# Patient Record
Sex: FEMALE | Race: WHITE | Employment: FULL TIME | URBAN - METROPOLITAN AREA
[De-identification: names, ages, dates, MRNs, and addresses within clinical notes are randomized per-mention and may not be internally consistent; named-entity substitution may affect disease eponyms.]

---

## 2023-01-04 ENCOUNTER — APPOINTMENT (OUTPATIENT)
Dept: GENERAL RADIOLOGY | Age: 23
End: 2023-01-04

## 2023-01-04 ENCOUNTER — HOSPITAL ENCOUNTER (EMERGENCY)
Age: 23
Discharge: HOME | End: 2023-01-04

## 2023-01-04 VITALS
SYSTOLIC BLOOD PRESSURE: 141 MMHG | WEIGHT: 293 LBS | TEMPERATURE: 98.8 F | HEART RATE: 64 BPM | OXYGEN SATURATION: 100 % | RESPIRATION RATE: 16 BRPM | DIASTOLIC BLOOD PRESSURE: 80 MMHG

## 2023-01-04 DIAGNOSIS — F17.200 SMOKER: ICD-10-CM

## 2023-01-04 DIAGNOSIS — M25.561 ACUTE PAIN OF RIGHT KNEE: Primary | ICD-10-CM

## 2023-01-04 PROCEDURE — 73590 X-RAY EXAM OF LOWER LEG: CPT

## 2023-01-04 PROCEDURE — 73560 X-RAY EXAM OF KNEE 1 OR 2: CPT

## 2023-01-04 PROCEDURE — 99283 EMERGENCY DEPT VISIT LOW MDM: CPT

## 2023-01-04 RX ORDER — LIDOCAINE 50 MG/G
1 PATCH TOPICAL DAILY
Qty: 10 PATCH | Refills: 0 | Status: SHIPPED | OUTPATIENT
Start: 2023-01-04 | End: 2023-01-14

## 2023-01-04 ASSESSMENT — ENCOUNTER SYMPTOMS
ABDOMINAL PAIN: 0
SHORTNESS OF BREATH: 0
VOMITING: 0
BACK PAIN: 0
DIARRHEA: 0
NAUSEA: 0
CONSTIPATION: 0
EYE PAIN: 0
SORE THROAT: 0
COUGH: 0
RHINORRHEA: 0

## 2023-01-04 NOTE — DISCHARGE INSTRUCTIONS
Please take Tylenol/Motrin as you were taking before coming to ED. Please take lidocaine patches as prescribed. Please follow-up with the orthopedic doctor as we discussed.

## 2023-01-04 NOTE — Clinical Note
Patty Stinson was seen and treated in our emergency department on 1/4/2023. She may return to work on 01/06/2023. Please allow Jason Thomas to sit at work until she is cleared by either her family doctor or an orthopedic doctor     If you have any questions or concerns, please don't hesitate to call.       Juan Chau, JEFFREY - CNP

## 2023-01-05 NOTE — ED PROVIDER NOTES
[address] ENCOUNTER        Pt Name: Clay Alfred  MRN: [id number]  [place] 2000  Date of evaluation: 1/4/2023  Provider: JEFFREY Mills CNP  PCP: None None  Note Started: 8:13 PM EST 1/4/23      SAMEERA. I have evaluated this patient. My supervising physician was available for consultation. CHIEF COMPLAINT       Chief Complaint   Patient presents with    Knee Pain     R, since last thursday         HISTORY OF PRESENT ILLNESS: 1 or more Elements     History From: Patient  Limitations to history : None    Clay Alfred is a 23 y.o. female who presents to the ED with concern for right knee pain. This been ongoing since the snow and ice last week. Unsure if she twisted at that time. Has chronic MSK problems. Today she has pain with ambulation with a popping sound that she hears when she straightens her leg completely. She is tender to the medial and lateral aspects of the knee. She is also requesting a work note so she can sit at work. Nursing Notes were all reviewed and agreed with or any disagreements were addressed in the HPI. REVIEW OF SYSTEMS :      Review of Systems   Constitutional:  Negative for chills, diaphoresis and fever. HENT:  Negative for congestion, rhinorrhea and sore throat. Eyes:  Negative for pain and visual disturbance. Respiratory:  Negative for cough and shortness of breath. Cardiovascular:  Negative for chest pain and leg swelling. Gastrointestinal:  Negative for abdominal pain, constipation, diarrhea, nausea and vomiting. Genitourinary:  Negative for frequency and hematuria. Musculoskeletal:  Positive for arthralgias, gait problem (Painful weightbearing. Walking with crutches) and myalgias. Negative for back pain, joint swelling and neck pain. Skin:  Negative for rash and wound. Neurological:  Negative for dizziness and light-headedness.      Positives and Pertinent negatives as per HPI.     SURGICAL HISTORY   No past surgical history on file. [id number]       Discharge Medication List as of 1/4/2023  6:50 PM          ALLERGIES     Patient has no known allergies. FAMILYHISTORY     No family history on file. SOCIAL HISTORY          SCREENINGS        [place] Coma Scale  Eye Opening: Spontaneous  Best Verbal Response: Oriented  Best Motor Response: Obeys commands  [place] Coma Scale Score: 15                CIWA Assessment  BP: (!) 141/80  Heart Rate: 64           PHYSICAL EXAM  1 or more Elements     ED Triage Vitals [01/04/23 1719]   BP Temp Temp Source Heart Rate Resp SpO2 Height Weight   (!) 141/80 98.8 °F (37.1 °C) Oral 54 16 100 % -- (!) 315 lb 11.2 oz (143.2 kg)       Physical Exam  Vitals and nursing note reviewed. Constitutional:       Appearance: Normal appearance. She is obese. She is not ill-appearing, toxic-appearing or diaphoretic. HENT:      Head: Normocephalic and atraumatic. Right Ear: External ear normal.      Left Ear: External ear normal.      Nose: Nose normal. No congestion or rhinorrhea. Mouth/Throat:      Mouth: Mucous membranes are moist.      Pharynx: Oropharynx is clear. No oropharyngeal exudate or posterior oropharyngeal erythema. Eyes:      General: No scleral icterus. Right eye: No discharge. Left eye: No discharge. Extraocular Movements: Extraocular movements intact. Conjunctiva/sclera: Conjunctivae normal.      Pupils: Pupils are equal, round, and reactive to light. Cardiovascular:      Rate and Rhythm: Normal rate and regular rhythm. Pulses: Normal pulses. Heart sounds: Normal heart sounds. No murmur heard. No friction rub. No gallop. Pulmonary:      Effort: Pulmonary effort is normal. No respiratory distress. Breath sounds: Normal breath sounds. No stridor. No wheezing, rhonchi or rales. Abdominal:      General: Abdomen is flat. Bowel sounds are normal. There is no distension. Palpations: Abdomen is soft. Tenderness: There is no abdominal tenderness. There is no right CVA tenderness, left CVA tenderness or guarding. Musculoskeletal:         General: Tenderness present. No deformity. Normal range of motion. Cervical back: Normal range of motion and neck supple. No rigidity or tenderness. Right knee: No swelling, deformity, bony tenderness or crepitus. Tenderness present over the medial joint line and lateral joint line. No patellar tendon tenderness. Left knee: Normal.      Comments: she is not tender to axial loading   Skin:     General: Skin is warm and dry. Capillary Refill: Capillary refill takes less than 2 seconds. Coloration: Skin is not jaundiced or pale. Findings: No rash. Neurological:      General: No focal deficit present. Mental Status: She is alert and oriented to person, place, and time. Mental status is at baseline. Psychiatric:         Mood and Affect: Mood normal.         Behavior: Behavior normal.     DIAGNOSTIC RESULTS   LABS:    Labs Reviewed - No data to display    When ordered only abnormal lab results are displayed. All other labs were within normal range or not returned as of this dictation. EKG: When ordered, EKG's are interpreted by the Emergency Department Physician in the absence of a cardiologist.  Please see their note for interpretation of EKG. RADIOLOGY:   Non-plain film images such as CT, Ultrasound and MRI are read by the radiologist. Plain radiographic images are visualized and preliminarily interpreted by the ED Provider with the below findings:    As below    Interpretation per the Radiologist below, if available at the time of this note:    XR TIBIA FIBULA RIGHT (2 VIEWS)   Final Result   No acute fracture or dislocation associated with the right knee or leg. XR KNEE RIGHT (1-2 VIEWS)   Final Result   No acute fracture or dislocation associated with the right knee or leg.            XR KNEE RIGHT (1-2 VIEWS)    Result Date: 1/4/2023  EXAMINATION: 4 XRAY VIEWS OF THE RIGHT TIBIA AND FIBULA; 2 XRAY VIEWS OF THE RIGHT KNEE 1/4/2023 2:55 pm COMPARISON: None. HISTORY: ORDERING SYSTEM PROVIDED HISTORY: pain TECHNOLOGIST PROVIDED HISTORY: Reason for exam:->pain Reason for Exam: PT. STATES X 1 WEEK AGO WAS WALKING UP A HILL AND RT. KNEE STARTED POPPING AND RT. KNEE BUCKLED C/O BILATERAL PAIN TO RT. KNEE; ORDERING SYSTEM PROVIDED HISTORY: pain TECHNOLOGIST PROVIDED HISTORY: Reason for exam:->pain Reason for Exam: PT. STATES X 1 WEEK AGO WAS WALKING UP A HILL AND RT. KNEE STARTED POPPING AND THEN BUCKLED  PT. C/O RADIATING PAIN MEDIAL AND LATERAL SIDE RT. KNEE FINDINGS: Mineralization appears normal.  No effusion or hemarthrosis is noted. The joint space appears normal. No fracture, dislocation or focal bone lesion is identified. No acute fracture or dislocation associated with the right knee or leg. XR TIBIA FIBULA RIGHT (2 VIEWS)    Result Date: 1/4/2023  EXAMINATION: 4 XRAY VIEWS OF THE RIGHT TIBIA AND FIBULA; 2 XRAY VIEWS OF THE RIGHT KNEE 1/4/2023 2:55 pm COMPARISON: None. HISTORY: ORDERING SYSTEM PROVIDED HISTORY: pain TECHNOLOGIST PROVIDED HISTORY: Reason for exam:->pain Reason for Exam: PT. STATES X 1 WEEK AGO WAS WALKING UP A HILL AND RT. KNEE STARTED POPPING AND RT. KNEE BUCKLED C/O BILATERAL PAIN TO RT. KNEE; ORDERING SYSTEM PROVIDED HISTORY: pain TECHNOLOGIST PROVIDED HISTORY: Reason for exam:->pain Reason for Exam: PT. STATES X 1 WEEK AGO WAS WALKING UP A HILL AND RT. KNEE STARTED POPPING AND THEN BUCKLED  PT. C/O RADIATING PAIN MEDIAL AND LATERAL SIDE RT. KNEE FINDINGS: Mineralization appears normal.  No effusion or hemarthrosis is noted. The joint space appears normal. No fracture, dislocation or focal bone lesion is identified. No acute fracture or dislocation associated with the right knee or leg. No results found.     PROCEDURES   Unless otherwise noted below, none Procedures    CRITICAL CARE TIME (.cctime)       PAST MEDICAL HISTORY      has no past medical history on file. EMERGENCY DEPARTMENT COURSE and DIFFERENTIAL DIAGNOSIS/MDM:   Vitals:    Vitals:    01/04/23 1719 01/04/23 1859   BP: (!) 141/80    Pulse: 54 64   Resp: 16 16   Temp: 98.8 °F (37.1 °C)    TempSrc: Oral    SpO2: 100% 100%   Weight: (!) 315 lb 11.2 oz (143.2 kg)        Patient was given the following medications:  Medications - No data to display          Is this patient to be included in the SEP-1 Core Measure due to severe sepsis or septic shock? No   Exclusion criteria - the patient is NOT to be included for SEP-1 Core Measure due to:  2+ SIRS criteria are not met    Chronic Conditions affecting care: Mental health concerns. Nothing acute today   has no past medical history on file. CONSULTS: (Who and What was discussed)  None      Social Determinants : Patient does not have insurance and Patient has / had difficulty affording medications     Records Reviewed (Source):       CC/HPI Summary, DDx, ED Course, and Reassessment:   Differential diagnosis: includes but not limited to Meniscus tear, ACL tear, tibial plateau fracture, extensor mechanism rupture, dislocation, Arterial Injury/Ischemia, Infection, Neurologic Deficit/Injury. This is a very pleasant patient with knee pain. Patient denies any trauma or recent injuries. Vitals have been reviewed and are stable; patient is afebrile and nontoxic appearing. As the joint is not warm, erythematous, stiff, or significantly swollen, I do not think this is a septic joint. On exam there is also no significant evidence for a fracture, dislocation, compartment syndrome, neurovascular compromise, obvious ligament, tendon or soft tissue injury, baker's cyst, soft tissue infection, vascular concern such as a DVT, or any other process requiring immediate surgical intervention or additional testing at this time. The patient is given a knee immobilizer. She already has crutches. X-ray as above without acute findings     I have recommended close follow-up specifically with an orthopedist for additional testing and consultation. Possibly an MRI may be indicated in the outpatient setting. It is understood that if the patient is not improving as expected or if other new symptoms or signs of concern develop, other etiologies or diagnoses may need to be considered requiring other tests, treatments, consultations, and/or admission. The diagnosis, plan, expected course, follow-up, and return precautions were discussed and all questions were answered. Disposition Considerations (tests considered but not done, Admit vs D/C, Shared Decision Making, Pt Expectation of Test or Tx.): Encouraged her to discontinue vaping for roughly 3 minutes      I am the Primary Clinician of Record. FINAL IMPRESSION      1. Acute pain of right knee    2.  Smoker          DISPOSITION/PLAN     DISPOSITION Decision To Discharge 01/04/2023 06:29:51 PM      PATIENT REFERRED TO:  Kalie Vyas MD  [address] [place] [address]  [address] [place] [address]  [phone]    Call in 5 days  if symptoms do not improve      DISCHARGE MEDICATIONS:  Discharge Medication List as of 1/4/2023  6:50 PM        START taking these medications    Details   lidocaine (LIDODERM) 5 % Place 1 patch onto the skin daily for 10 days 12 hours on, 12 hours off., Disp-10 patch, R-0Print             DISCONTINUED MEDICATIONS:  Discharge Medication List as of 1/4/2023  6:50 PM                 (Please note that portions of this note were completed with a voice recognition program.  Efforts were made to edit the dictations but occasionally words are mis-transcribed.)    JEFFRYE Rivera CNP (electronically signed)       JEFFREY Rivera CNP  01/04/23 2018       JEFFREY Rivera CNP  01/04/23 2018